# Patient Record
Sex: FEMALE | Race: WHITE | NOT HISPANIC OR LATINO | URBAN - METROPOLITAN AREA
[De-identification: names, ages, dates, MRNs, and addresses within clinical notes are randomized per-mention and may not be internally consistent; named-entity substitution may affect disease eponyms.]

---
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
## 2025-02-18 ENCOUNTER — APPOINTMENT (OUTPATIENT)
Dept: CARDIOLOGY | Facility: CLINIC | Age: 20
End: 2025-02-18
Payer: COMMERCIAL

## 2025-06-06 DIAGNOSIS — N92.6 IRREGULAR MENSTRUAL BLEEDING: ICD-10-CM

## 2025-06-10 RX ORDER — NORGESTIMATE AND ETHINYL ESTRADIOL 0.25-0.035
1 KIT ORAL DAILY
Qty: 84 TABLET | Refills: 3 | Status: SHIPPED | OUTPATIENT
Start: 2025-06-10

## 2025-06-17 ENCOUNTER — APPOINTMENT (OUTPATIENT)
Dept: OBSTETRICS AND GYNECOLOGY | Facility: CLINIC | Age: 20
End: 2025-06-17
Payer: COMMERCIAL

## 2025-06-17 VITALS
HEIGHT: 61 IN | DIASTOLIC BLOOD PRESSURE: 60 MMHG | WEIGHT: 123 LBS | BODY MASS INDEX: 23.22 KG/M2 | SYSTOLIC BLOOD PRESSURE: 110 MMHG

## 2025-06-17 DIAGNOSIS — Z01.419 WELL WOMAN EXAM: Primary | ICD-10-CM

## 2025-06-17 DIAGNOSIS — N94.6 DYSMENORRHEA IN ADOLESCENT: ICD-10-CM

## 2025-06-17 DIAGNOSIS — Z11.3 ROUTINE SCREENING FOR STI (SEXUALLY TRANSMITTED INFECTION): ICD-10-CM

## 2025-06-17 PROCEDURE — 1036F TOBACCO NON-USER: CPT | Performed by: OBSTETRICS & GYNECOLOGY

## 2025-06-17 PROCEDURE — 3008F BODY MASS INDEX DOCD: CPT | Performed by: OBSTETRICS & GYNECOLOGY

## 2025-06-17 PROCEDURE — 99395 PREV VISIT EST AGE 18-39: CPT | Performed by: OBSTETRICS & GYNECOLOGY

## 2025-06-17 RX ORDER — NORGESTIMATE AND ETHINYL ESTRADIOL 0.25-0.035
1 KIT ORAL DAILY
Qty: 84 TABLET | Refills: 3 | Status: SHIPPED | OUTPATIENT
Start: 2025-06-17 | End: 2026-06-17

## 2025-06-17 NOTE — PROGRESS NOTES
Subjective   Patient ID: Ema Enriquez is a 20 y.o. female who presents for Well Women Visit.  21 yo Go for for [employer].  Using Keren for menstrual control, monthly bleed, no IMB.  + SI no problems, one partner, + condoms.   No vaginal itching, burning, discharge, no lesions of concern.   No urinary or bowel changes.      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  No T/E/D  Seatbelts always.  No IPV.  Working at [employer] this summer.       Review of Systems   All other systems reviewed and are negative.    Objective   Physical Exam  Vitals and nursing note reviewed. Exam conducted with a chaperone present.   Constitutional:       General: She is not in acute distress.     Appearance: Normal appearance. She is not ill-appearing.   HENT:      Head: Normocephalic and atraumatic.      Nose: Nose normal.   Eyes:      Conjunctiva/sclera: Conjunctivae normal.      Pupils: Pupils are equal, round, and reactive to light.   Neck:      Thyroid: No thyroid mass, thyromegaly or thyroid tenderness.   Cardiovascular:      Rate and Rhythm: Normal rate and regular rhythm.      Pulses: Normal pulses.      Heart sounds: Normal heart sounds.   Pulmonary:      Effort: Pulmonary effort is normal.      Breath sounds: Normal breath sounds.   Chest:   Breasts:     Right: Normal. No mass, nipple discharge or skin change.      Left: Normal. No mass, nipple discharge or skin change.   Abdominal:      General: Abdomen is flat. There is no distension.      Palpations: Abdomen is soft. There is no mass.      Tenderness: There is no abdominal tenderness. There is no right CVA tenderness or left CVA tenderness.      Hernia: No hernia is present.   Genitourinary:     General: Normal vulva.      Exam position: Lithotomy position.      Pubic Area: No rash.       Labia:         Right: No rash, tenderness or lesion.         Left: No rash, tenderness or lesion.       Urethra: No prolapse, urethral swelling or urethral lesion.      Vagina: No vaginal discharge.    Musculoskeletal:      Cervical back: Normal range of motion.   Lymphadenopathy:      Upper Body:      Right upper body: No axillary adenopathy.      Left upper body: No axillary adenopathy.   Skin:     General: Skin is warm and dry.   Neurological:      General: No focal deficit present.      Mental Status: She is alert and oriented to person, place, and time.   Psychiatric:         Attention and Perception: Attention and perception normal.         Mood and Affect: Mood and affect normal.         Speech: Speech normal.         Behavior: Behavior normal.     Assessment/Plan   Problem List Items Addressed This Visit           ICD-10-CM    Dysmenorrhea in adolescent N94.6    Doing well on Keren (NGM/EE monophasic) with no BTB and good cycle and pain control.  We discussed that if she chooses to discontinue at some point she may find her menses are improved.    Will continue for now.  New Rx given.  May need extra pack for semester abroad.         Relevant Medications    norgestimate-ethinyl estradiol (Ortho-Cyclen) 0.25-0.035 mg tablet    Well woman exam - Primary Z01.419    NL CBE.  NL external pelvic exam.  Pap next year at 21.   Urine STI testing accepted.  Labs ordered, will have drawn with fasting cholesterol in August per Dr. Mcmullen.           Relevant Orders    Thyroid Stimulating Hormone    CBC    Vitamin D 25-Hydroxy,Total (for eval of Vitamin D levels)    Comprehensive Metabolic Panel            Stephanie Mahmood MD MPH 06/17/25 3:50 PM

## 2025-06-17 NOTE — ASSESSMENT & PLAN NOTE
Doing well on Keren (NGM/EE monophasic) with no BTB and good cycle and pain control.  We discussed that if she chooses to discontinue at some point she may find her menses are improved.    Will continue for now.  New Rx given.  May need extra pack for semester abroad.

## 2025-06-17 NOTE — PROGRESS NOTES
19 yo Go for for [employer].  Using Keren for menstrual control, monthly bleed, no IMB.  + SI no problems, one partner, + condoms.   No vaginal itching, burning, discharge, no lesions of concern.   No urinary or bowel changes.      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  No T/E/D  Seatbelts always.  No IPV.  Working at [employer] this summer.

## 2025-06-17 NOTE — ASSESSMENT & PLAN NOTE
NL CBE.  NL external pelvic exam.  Pap next year at 21.   Urine STI testing accepted.  Labs ordered, will have drawn with fasting cholesterol in August per Dr. Mcmullen.

## 2025-06-18 LAB
C TRACH RRNA SPEC QL NAA+PROBE: NOT DETECTED
N GONORRHOEA RRNA SPEC QL NAA+PROBE: NOT DETECTED
QUEST GC CT AMPLIFIED (ALWAYS MESSAGE): NORMAL
QUEST GC CT AMPLIFIED (ALWAYS MESSAGE): NORMAL
T VAGINALIS RRNA SPEC QL NAA+PROBE: NOT DETECTED